# Patient Record
Sex: MALE | Race: WHITE | NOT HISPANIC OR LATINO | URBAN - METROPOLITAN AREA
[De-identification: names, ages, dates, MRNs, and addresses within clinical notes are randomized per-mention and may not be internally consistent; named-entity substitution may affect disease eponyms.]

---

## 2017-06-14 ENCOUNTER — TRANSCRIBE ORDERS (OUTPATIENT)
Dept: LAB | Facility: HOSPITAL | Age: 62
End: 2017-06-14

## 2017-06-14 ENCOUNTER — LAB (OUTPATIENT)
Dept: LAB | Facility: HOSPITAL | Age: 62
End: 2017-06-14

## 2017-06-14 DIAGNOSIS — R53.83 OTHER FATIGUE: ICD-10-CM

## 2017-06-14 DIAGNOSIS — R53.83 OTHER FATIGUE: Primary | ICD-10-CM

## 2017-06-14 PROCEDURE — 36415 COLL VENOUS BLD VENIPUNCTURE: CPT

## 2017-06-14 PROCEDURE — 86481 TB AG RESPONSE T-CELL SUSP: CPT | Performed by: INTERNAL MEDICINE

## 2017-06-19 LAB — REF LAB TEST METHOD: NORMAL

## 2024-05-16 RX ORDER — METHOTREXATE 2.5 MG/1
TABLET ORAL
COMMUNITY
Start: 2024-04-25

## 2024-05-16 RX ORDER — FOLIC ACID 1 MG/1
2000 TABLET ORAL DAILY
Qty: 180 TABLET | Refills: 3 | Status: SHIPPED | OUTPATIENT
Start: 2024-05-16

## 2024-05-16 RX ORDER — FOLIC ACID 1 MG/1
2 TABLET ORAL DAILY
COMMUNITY
Start: 2024-02-06 | End: 2024-05-16 | Stop reason: SDUPTHER

## 2024-05-16 RX ORDER — HYDROXYCHLOROQUINE SULFATE 200 MG/1
1 TABLET, FILM COATED ORAL EVERY 12 HOURS SCHEDULED
COMMUNITY
Start: 2024-02-06

## 2024-06-28 RX ORDER — METHOTREXATE 2.5 MG/1
TABLET ORAL
Qty: 120 TABLET | Refills: 1 | OUTPATIENT
Start: 2024-06-28

## 2024-07-01 RX ORDER — METHOTREXATE 2.5 MG/1
TABLET ORAL
Status: CANCELLED | OUTPATIENT
Start: 2024-07-01

## 2024-07-01 NOTE — TELEPHONE ENCOUNTER
Incoming Refill Request      Medication requested (name and dose): Methotrexate    Pharmacy where request should be sent: car    Additional details provided by patient: N/A    Best call back number: [phone]    Does the patient have less than a 3 day supply:  [] Yes  [x] No    Emili Bull Rep  07/01/24, 09:39 EDT

## 2024-07-02 ENCOUNTER — TELEPHONE (OUTPATIENT)
Age: 69
End: 2024-07-02

## 2024-07-02 RX ORDER — METHOTREXATE 2.5 MG/1
TABLET ORAL
Qty: 120 TABLET | Refills: 0 | Status: SHIPPED | OUTPATIENT
Start: 2024-07-02 | End: 2024-07-05

## 2024-07-05 RX ORDER — METHOTREXATE 2.5 MG/1
TABLET ORAL
Qty: 120 TABLET | Refills: 0 | Status: SHIPPED | OUTPATIENT
Start: 2024-07-05

## 2024-09-25 PROBLEM — M06.9 RA (RHEUMATOID ARTHRITIS): Status: ACTIVE | Noted: 2024-09-25

## 2024-10-10 ENCOUNTER — TELEPHONE (OUTPATIENT)
Age: 69
End: 2024-10-10

## 2024-10-10 ENCOUNTER — OFFICE VISIT (OUTPATIENT)
Age: 69
End: 2024-10-10
Payer: MEDICARE

## 2024-10-10 VITALS
WEIGHT: 212.4 LBS | TEMPERATURE: 98 F | BODY MASS INDEX: 29.73 KG/M2 | DIASTOLIC BLOOD PRESSURE: 86 MMHG | SYSTOLIC BLOOD PRESSURE: 148 MMHG | HEART RATE: 96 BPM | HEIGHT: 71 IN

## 2024-10-10 DIAGNOSIS — M05.79 RHEUMATOID ARTHRITIS INVOLVING MULTIPLE SITES WITH POSITIVE RHEUMATOID FACTOR: Primary | ICD-10-CM

## 2024-10-10 DIAGNOSIS — D84.821 IMMUNOSUPPRESSION DUE TO DRUG THERAPY: ICD-10-CM

## 2024-10-10 DIAGNOSIS — Z79.899 IMMUNOSUPPRESSION DUE TO DRUG THERAPY: ICD-10-CM

## 2024-10-10 DIAGNOSIS — R76.8 ANA POSITIVE: ICD-10-CM

## 2024-10-10 DIAGNOSIS — Z79.899 HIGH RISK MEDICATION USE: ICD-10-CM

## 2024-10-10 DIAGNOSIS — J84.89 NSIP (NONSPECIFIC INTERSTITIAL PNEUMONIA): ICD-10-CM

## 2024-10-10 DIAGNOSIS — I73.00 RAYNAUD'S DISEASE WITHOUT GANGRENE: ICD-10-CM

## 2024-10-10 PROCEDURE — 1160F RVW MEDS BY RX/DR IN RCRD: CPT | Performed by: INTERNAL MEDICINE

## 2024-10-10 PROCEDURE — 99214 OFFICE O/P EST MOD 30 MIN: CPT | Performed by: INTERNAL MEDICINE

## 2024-10-10 PROCEDURE — G2211 COMPLEX E/M VISIT ADD ON: HCPCS | Performed by: INTERNAL MEDICINE

## 2024-10-10 PROCEDURE — 1159F MED LIST DOCD IN RCRD: CPT | Performed by: INTERNAL MEDICINE

## 2024-10-10 RX ORDER — HYDROXYCHLOROQUINE SULFATE 200 MG/1
200 TABLET, FILM COATED ORAL 2 TIMES DAILY
Qty: 180 TABLET | Refills: 1 | Status: SHIPPED | OUTPATIENT
Start: 2024-10-10

## 2024-10-10 RX ORDER — METHOTREXATE 2.5 MG/1
TABLET ORAL
Qty: 120 TABLET | Refills: 0 | Status: SHIPPED | OUTPATIENT
Start: 2024-10-10

## 2024-10-10 RX ORDER — FOLIC ACID 1 MG/1
2000 TABLET ORAL DAILY
Qty: 180 TABLET | Refills: 3 | Status: SHIPPED | OUTPATIENT
Start: 2024-10-10

## 2024-10-10 NOTE — PATIENT INSTRUCTIONS
Methotrexate Tablets    What is this medication?  METHOTREXATE (METH oh TREX ate) treats autoimmune conditions, such as arthritis and psoriasis. It works by decreasing inflammation, which can reduce pain and prevent long-term injury to the joints and skin. It may also be used to treat some types of cancer. It works by slowing down the growth of cancer cells.  This medicine may be used for other purposes; ask your health care provider or pharmacist if you have questions.  COMMON BRAND NAME(S): Rheumatrex, Trexall    What should I tell my care team before I take this medication?  They need to know if you have any of these conditions:  Dehydration  Diabetes  Fluid in the stomach area or lungs  Frequently drink alcohol  Having surgery, including dental surgery  High cholesterol  Immune system problems  Inflammatory bowel disease, such as ulcerative colitis  Kidney disease  Liver disease  Low blood cell levels (white cells, red cells, and platelets)  Lung disease  Recent or ongoing radiation  Recent or upcoming vaccine  Stomach ulcers, other stomach or intestine problems  An unusual or allergic reaction to methotrexate, other medications, foods, dyes, or preservatives  Pregnant or trying to get pregnant  Breastfeeding    How should I use this medication?  Take this medication by mouth with water. Take it as directed on the prescription label. Do not take extra. Keep taking this medication until your care team tells you to stop.  Know why you are taking this medication and how you should take it. To treat conditions such as arthritis and psoriasis, this medication is taken ONCE A WEEK as a single dose or divided into 3 smaller doses taken 12 hours apart (do not take more than 3 doses 12 hours apart each week). This medication is NEVER taken daily to treat conditions other than cancer. Taking this medication more often than directed can cause serious side effects, even death. Talk to your care team about why you are taking  "this medication, how often you will take it, and what your dose is. Ask your care team to put the reason you take this medication on the prescription.  If you take this medication ONCE A WEEK, choose a day of the week before you start. Ask your pharmacist to include the day of the week on the label. Avoid \"Monday\", which could be misread as \"Morning\".  Handling this medication may be harmful. Talk to your care team about how to handle this medication. Special instructions may apply.  Talk to your care team about the use of this medication in children. While it may be prescribed for selected conditions, precautions do apply.  Overdosage: If you think you have taken too much of this medicine contact a poison control center or emergency room at once.  NOTE: This medicine is only for you. Do not share this medicine with others.    What if I miss a dose?  If you miss a dose, talk with your care team. Do not take double or extra doses.    What may interact with this medication?  Do not take this medication with any of the following:  Acitretin  Live virus vaccines  Probenecid  This medication may also interact with the following:  Alcohol  Aspirin and aspirin-like medications  Certain antibiotics, such as penicillin, neomycin, sulfamethoxazole; trimethoprim  Certain medications for stomach problems, such as lansoprazole, omeprazole, pantoprazole  Clozapine  Cyclosporine  Dapsone  Folic acid  Foscarnet  NSAIDs, medications for pain and inflammation, such as ibuprofen or naproxen  Phenytoin  Pyrimethamine  Steroid medications, such as prednisone or cortisone  Tacrolimus  Theophylline  This list may not describe all possible interactions. Give your health care provider a list of all the medicines, herbs, non-prescription drugs, or dietary supplements you use. Also tell them if you smoke, drink alcohol, or use illegal drugs. Some items may interact with your medicine.    What should I watch for while using this " medication?  Visit your care team for regular checks on your progress. It may be some time before you see the benefit from this medication.  You may need blood work done while you are taking this medication.  If your care team has also prescribed folic acid, they may instruct you to skip your folic acid dose on the day you take methotrexate.  This medication can make you more sensitive to the sun. Keep out of the sun. If you cannot avoid being in the sun, wear protective clothing and sunscreen. Do not use sun lamps, tanning beds, or tanning booths.  Check with your care team if you have severe diarrhea, nausea, and vomiting, or if you sweat a lot. The loss of too much body fluid may make it dangerous for you to take this medication.  This medication may increase your risk of getting an infection. Call your care team for advice if you get a fever, chills, sore throat, or other symptoms of a cold or flu. Do not treat yourself. Try to avoid being around people who are sick.  Talk to your care team about your risk of cancer. You may be more at risk for certain types of cancers if you take this medication.  Talk to your care team if you or your partner may be pregnant. Serious birth defects can occur if you take this medication during pregnancy and for 6 months after the last dose. You will need a negative pregnancy test before starting this medication. Contraception is recommended while taking this medication and for 6 months after the last dose. Your care team can help you find the option that works for you.  If your partner can get pregnant, use a condom during sex while taking this medication and for 3 months after the last dose.  Do not breastfeed while taking this medication and for 1 week after the last dose.  This medication may cause infertility. Talk to your care team if you are concerned about your fertility.    What side effects may I notice from receiving this medication?  Side effects that you should report  to your care team as soon as possible:  Allergic reactions--skin rash, itching, hives, swelling of the face, lips, tongue, or throat  Dry cough, shortness of breath or trouble breathing  Infection--fever, chills, cough, sore throat, wounds that don't heal, pain or trouble when passing urine, general feeling of discomfort or being unwell  Kidney injury--decrease in the amount of urine, swelling of the ankles, hands, or feet  Liver injury--right upper belly pain, loss of appetite, nausea, light-colored stool, dark yellow or brown urine, yellowing skin or eyes, unusual weakness or fatigue  Low red blood cell level--unusual weakness or fatigue, dizziness, headache, trouble breathing  Pain, tingling, or numbness in the hands or feet, muscle weakness, change in vision, confusion or trouble speaking, loss of balance or coordination, trouble walking, seizures  Redness, blistering, peeling, or loosening of the skin, including inside the mouth  Stomach bleeding--bloody or black, tar-like stools, vomiting blood or brown material that looks like coffee grounds  Stomach pain that is severe, does not away, or gets worse  Unusual bruising or bleeding  Side effects that usually do not require medical attention (report these to your care team if they continue or are bothersome):  Diarrhea  Dizziness  Hair loss  Nausea  Pain, redness, or swelling with sores inside the mouth or throat  Skin reactions on sun-exposed areas  Vomiting  This list may not describe all possible side effects. Call your doctor for medical advice about side effects. You may report side effects to FDA at 8-797-FDA-4893.    Where should I keep my medication?  Keep out of the reach of children and pets.  Store at room temperature between 20 and 25 degrees C (68 and 77 degrees F). Protect from light. Keep the container tightly closed. Get rid of any unused medication after the expiration date.  To get rid of medications that are no longer needed or have  :  Take the medication to a medication take-back program. Check with your pharmacy or law enforcement to find a location.  If you cannot return the medication, ask your pharmacist or care team how to get rid of this medication safely.  NOTE: This sheet is a summary. It may not cover all possible information. If you have questions about this medicine, talk to your doctor, pharmacist, or health care provider.  ©  [employer]/Gold Standard (2024 00:00:00) Hydroxychloroquine Tablets    What is this medication?  HYDROXYCHLOROQUINE (samy drox ee KLOR oh kwin) treats autoimmune conditions, such as rheumatoid arthritis and lupus. It works by slowing down an overactive immune system. It may also be used to prevent and treat malaria. It works by killing the parasite that causes malaria. It belongs to a group of medications called DMARDs.  This medicine may be used for other purposes; ask your health care provider or pharmacist if you have questions.  COMMON BRAND NAME(S): Plaquenil, Quineprox    What should I tell my care team before I take this medication?  They need to know if you have any of these conditions:  Diabetes  Eye disease, vision problems  Frequently drink alcohol  G6PD deficiency  Heart disease  Irregular heartbeat or rhythm  Kidney disease  Liver disease  Porphyria  Psoriasis  An unusual or allergic reaction to hydroxychloroquine, other medications, foods, dyes, or preservatives  Pregnant or trying to get pregnant  Breastfeeding    How should I use this medication?  Take this medication by mouth with water. Take it as directed on the prescription label. Do not cut, crush, or chew this medication. Swallow the tablets whole. Take it with food. Do not take it more than directed. Take all of this medication unless your care team tells you to stop it early. Keep taking it even if you think you are better.  Take products with antacids in them at a different time of day than this medication. Take this  medication 4 hours before or 4 hours after antacids. Talk to your care team if you have questions.  Talk to your care team about the use of this medication in children. While this medication may be prescribed for selected conditions, precautions do apply.  Overdosage: If you think you have taken too much of this medicine contact a poison control center or emergency room at once.  NOTE: This medicine is only for you. Do not share this medicine with others.    What if I miss a dose?  If you miss a dose, take it as soon as you can. If it is almost time for your next dose, take only that dose. Do not take double or extra doses.    What may interact with this medication?  Do not take this medication with any of the following:  Cisapride  Dronedarone  Pimozide  Thioridazine  This medication may also interact with the following:  Ampicillin  Antacids  Cimetidine  Cyclosporine  Digoxin  Kaolin  Medications for diabetes, such as insulin, glipizide, glyburide  Medications for seizures, such as carbamazepine, phenobarbital, phenytoin  Mefloquine  Methotrexate  Other medications that cause heart rhythm changes  Praziquantel  This list may not describe all possible interactions. Give your health care provider a list of all the medicines, herbs, non-prescription drugs, or dietary supplements you use. Also tell them if you smoke, drink alcohol, or use illegal drugs. Some items may interact with your medicine.    What should I watch for while using this medication?  Visit your care team for regular checks on your progress. Tell your care team if your symptoms do not start to get better or if they get worse.  You may need blood work done while you are taking this medication. If you take other medications that can affect heart rhythm, you may need more testing. Talk to your care team if you have questions.  Your vision may be tested before and during use of this medication. Tell your care team right away if you have any change in  your eyesight.  This medication may cause serious skin reactions. They can happen weeks to months after starting the medication. Contact your care team right away if you notice fevers or flu-like symptoms with a rash. The rash may be red or purple and then turn into blisters or peeling of the skin. Or, you might notice a red rash with swelling of the face, lips or lymph nodes in your neck or under your arms.  If you or your family notice any changes in your behavior, such as new or worsening depression, thoughts of harming yourself, anxiety, or other unusual or disturbing thoughts, or memory loss, call your care team right away.    What side effects may I notice from receiving this medication?  Side effects that you should report to your care team as soon as possible:  Allergic reactions--skin rash, itching, hives, swelling of the face, lips, tongue, or throat  Aplastic anemia--unusual weakness or fatigue, dizziness, headache, trouble breathing, increased bleeding or bruising  Change in vision  Heart rhythm changes--fast or irregular heartbeat, dizziness, feeling faint or lightheaded, chest pain, trouble breathing  Infection--fever, chills, cough, or sore throat  Low blood sugar (hypoglycemia)--tremors or shaking, anxiety, sweating, cold or clammy skin, confusion, dizziness, rapid heartbeat  Muscle injury--unusual weakness or fatigue, muscle pain, dark yellow or brown urine, decrease in amount of urine  Pain, tingling, or numbness in the hands or feet  Rash, fever, and swollen lymph nodes  Redness, blistering, peeling, or loosening of the skin, including inside the mouth  Thoughts of suicide or self-harm, worsening mood, or feelings of depression  Unusual bruising or bleeding  Side effects that usually do not require medical attention (report to your care team if they continue or are bothersome):  Diarrhea  Headache  Nausea  Stomach pain  Vomiting  This list may not describe all possible side effects. Call your  doctor for medical advice about side effects. You may report side effects to FDA at 8-431-QWX-9582.    Where should I keep my medication?  Keep out of the reach of children and pets.  Store at room temperature up to 30 degrees C (86 degrees F). Protect from light. Get rid of any unused medication after the expiration date.  To get rid of medications that are no longer needed or have :  Take the medication to a medication take-back program. Check with your pharmacy or law enforcement to find a location.  If you cannot return the medication, check the label or package insert to see if the medication should be thrown out in the garbage or flushed down the toilet. If you are not sure, ask your care team. If it is safe to put it in the trash, empty the medication out of the container. Mix the medication with cat litter, dirt, coffee grounds, or other unwanted substance. Seal the mixture in a bag or container. Put it in the trash.  NOTE: This sheet is a summary. It may not cover all possible information. If you have questions about this medicine, talk to your doctor, pharmacist, or health care provider.  ©  [employer]/Gold Standard (2023 00:00:00)

## 2024-10-10 NOTE — ASSESSMENT & PLAN NOTE
Methotrexate, Plaquenil    No signs of toxicity.  Medications well tolerated and effective  Risks include but are not limited to severe liver damage that can be fatal, the possible need for liver biopsy, bone marrow suppression that can lead to dangerously low blood counts, GI side effects including mouth sores and diarrhea, fatigue, and rare risk of severe pulmonary complications. There should be no alcohol consumed with MTX. MTX can cause severe fetal abnormalities whether the mother or father is taking the medication and thus must be avoided if pregnancy is a possibility.  All medication is to be taken one day a week only. The need for q 8-12 week labs and the need for folic acid supplementation were discussed.     I discussed the side effects of hydroxychloroquine including but not limited to GI upset, rash, photosensitivity, Hematologic and liver abnormalities and ocular abnormalities and need for frequent eye exam for toxicity monitoring.

## 2024-10-10 NOTE — ASSESSMENT & PLAN NOTE
1:160; +SSA; equiv dsdna    no evidence of systemic lupus/connective tissue disease beyond Raynaud's and inflammatory arthritis.   I suspect this positive SHABBIR is related to rheumatoid

## 2024-10-10 NOTE — ASSESSMENT & PLAN NOTE
History of with history of fibrosis;   prior pulmonary dr olga sidhu ;dx spring 2017   stable on CT scan 2018    ? related to RA.  Clinically inactive  Excellent reports from his pulmonologist and has been released by pulmonary.    No significant pulmonary complaints.  No cough or shortness of breath  Chest x-ray last done 11/8/23 showing bilateral lower lobe opacities favored to represent scar by radiologist  -Follow chest x-ray every year or so

## 2024-10-10 NOTE — PROGRESS NOTES
Office Follow Up      Date: 10/10/2024   Patient Name: Max Gray  MRN: [id number]  YOB: 1955    Referring Physician: Sofi Villanueva APRN     Chief Complaint:   Chief Complaint   Patient presents with    Rheumatoid Arthritis       History of Present Illness: Max Gray is a 69 y.o. male who is here today for follow up on seropositive rheumatoid arthritis.      Continues on methotrexate and Plaquenil for rheumatoid arthritis.    He reports significant improvement in his arthritis since adding Plaquenil to methotrexate in July of 2022.   No swollen or tender joints presently.  Some triggering/locking of the left 2nd finger at times.  Medications well tolerated and effective.  No significant infection.  No side effects medications.  Eye exam stable 4/24    He has history of pulmonary fibrosis on CT scan chest in 2018.  He has been released by Pulmonary Dr. Washington.   No shortness of breath.  No progressive pulmonary symptoms      Subjective       Review of Systems: Review of Systems   Constitutional:  Negative for chills, fatigue, fever and unexpected weight loss.   HENT:  Negative for mouth sores, sinus pressure and sore throat.    Eyes:  Negative for pain and redness.   Respiratory:  Positive for cough. Negative for shortness of breath.    Cardiovascular:  Negative for chest pain.   Gastrointestinal:  Negative for abdominal pain, blood in stool, diarrhea, nausea, vomiting and GERD.   Endocrine: Negative for polydipsia and polyuria.   Genitourinary:  Negative for dysuria, genital sores and hematuria.   Musculoskeletal:  Negative for arthralgias, back pain, joint swelling, myalgias, neck pain and neck stiffness.   Skin:  Negative for rash and bruise.   Allergic/Immunologic: Negative for immunocompromised state.   Neurological:  Negative for seizures, weakness, numbness and memory problem.   Hematological:  Negative for adenopathy. Does not bruise/bleed easily.  "  Psychiatric/Behavioral:  Negative for depressed mood. The patient is not nervous/anxious.         Medications:   Current Outpatient Medications:     folic acid (FOLVITE) 1 MG tablet, Take 2 tablets by mouth Daily., Disp: 180 tablet, Rfl: 3    hydroxychloroquine (PLAQUENIL) 200 MG tablet, Take 1 tablet by mouth 2 (Two) Times a Day., Disp: 180 tablet, Rfl: 1    methotrexate 2.5 MG tablet, TAKE 10 TABLETS BY MOUTH EVERY WEEK, Disp: 120 tablet, Rfl: 0    Allergies:   Allergies   Allergen Reactions    Ondansetron Unknown - Low Severity       I have reviewed and updated the patient's chief complaint, history of present illness, review of systems, past medical history, surgical history, family history, social history, medications and allergy list as appropriate.     Objective        Vital Signs:   Vitals:    10/10/24 0956   BP: 148/86   BP Location: Left arm   Patient Position: Sitting   Cuff Size: Adult   Pulse: 96   Temp: 98 °F (36.7 °C)   Weight: 96.3 kg (212 lb 6.4 oz)   Height: 180.3 cm (71\")   PainSc: 0-No pain     Body mass index is 29.62 kg/m².      Physical Exam:  Physical Exam   MUSCULOSKELETAL:   No peripheral synovitis.  No tender joints    Complete joint exam was performed including the MCPs, PIPs, DIPs of the hands, wrists, elbows, shoulders, hips, knees and ankles.  No soft tissue swelling or tenderness is present except as above.    General: The patient is well-developed and well nourished. Cooperative, alert and oriented. Affect is normal. Hydration appears normal.   HEENT: Normocephalic and atraumatic. No notable alopecia. Lids and conjunctiva are normal. Pupils are equal and sclera are clear. Oropharynx is clear   NECK neck is supple without adenopathy, masses or thyromegaly.   CARDIOVASCULAR: Regular rate and rhythm. No murmurs, rubs or gallops   LUNGS: Effort is normal. Lungs are clear bilateral   ABDOMEN: Not examined  EXTREMITIES: Peripheral pulses are intact. No clubbing.   SKIN: No rashes. No " "subcutaneous nodules. No digital ulcers. No sclerodactyly.   NEUROLOGIC: Gait is normal. Strength testing is normal.  No focal neurologic deficits    Results Review:   Labs:   No results found for: \"GLUCOSE\", \"BUN\", \"CREATININE\", \"EGFRRESULT\", \"EGFR\", \"BCR\", \"K\", \"CO2\", \"CALCIUM\", \"PROTENTOTREF\", \"ALBUMIN\", \"BILITOT\", \"AST\", \"ALT\"  No results found for: \"WBC\", \"HGB\", \"HCT\", \"MCV\", \"PLT\"  No results found for: \"SEDRATE\"  No results found for: \"CRP\"  No results found for: \"QUANTIFERO\", \"QUANTITB1\", \"QUANTITB2\", \"QUANTIFERN\", \"QUANTIFERM\", \"QUANTITBGLDP\"  No results found for: \"RF\"  No results found for: \"HEPBSAG\", \"HEPAIGM\", \"HEPBIGMCORE\", \"HEPCVIRUSABY\"      Procedures    Assessment / Plan        Assessment & Plan  Rheumatoid arthritis involving multiple sites with positive rheumatoid factor  onset dz 2.2017; ++CCP ab; neg rf, elevated esr  NSIP/pulmonary fibrosis on imaging previously followed by Pulmonary Dr. Washington  chest x-ray 11/8/23: Bilateral lower lobe opacities favored to be scar by radiology  **Current:  methotrexate start April 2017, Plaquenil 7/22.    In remission.  Swollen joint count 0. Tender joint count 0.  Good prognosis.  Significant improvement since adding Plaquenil to methotrexate in July of 2022  No side effects to medicine.  Stable yearly eye exam for Plaquenil monitoring.  Records reviewed from Ophthalmology.  He often forgets 2nd Plaquenil pill.  Okay to decrease Plaquenil to 1 tablet daily  Continue labs every 12 weeks for monitoring.    [facility] which have not been sent to us   Labs 4/3/24 reviewed and stable.  New standing lab order provided  Medications refilled.  Chest x-ray yearly for monitoring of history of pulmonary fibrosis(next in spring 2025)  He no longer follows with Pulmonary.    Follow-up in 6 months  High risk medication use  Methotrexate, Plaquenil    No signs of toxicity.  Medications well tolerated and effective  Risks include but " are not limited to severe liver damage that can be fatal, the possible need for liver biopsy, bone marrow suppression that can lead to dangerously low blood counts, GI side effects including mouth sores and diarrhea, fatigue, and rare risk of severe pulmonary complications. There should be no alcohol consumed with MTX. MTX can cause severe fetal abnormalities whether the mother or father is taking the medication and thus must be avoided if pregnancy is a possibility.  All medication is to be taken one day a week only. The need for q 8-12 week labs and the need for folic acid supplementation were discussed.     I discussed the side effects of hydroxychloroquine including but not limited to GI upset, rash, photosensitivity, Hematologic and liver abnormalities and ocular abnormalities and need for frequent eye exam for toxicity monitoring.  Immunosuppression due to drug therapy    SHABBIR positive  1:160; +SSA; equiv dsdna    no evidence of systemic lupus/connective tissue disease beyond Raynaud's and inflammatory arthritis.   I suspect this positive SHABBIR is related to rheumatoid  NSIP (nonspecific interstitial pneumonia)  History of with history of fibrosis;   prior pulmonary dr olga sidhu ;dx spring 2017   stable on CT scan 2018    ? related to RA.  Clinically inactive  Excellent reports from his pulmonologist and has been released by pulmonary.    No significant pulmonary complaints.  No cough or shortness of breath  Chest x-ray last done 11/8/23 showing bilateral lower lobe opacities favored to represent scar by radiologist  -Follow chest x-ray every year or so    Raynaud's disease without gangrene  Not active  Recommend avoidance of cold, smoke, caffeine, stress which can exacerbate Raynaud's    Orders Placed This Encounter   Procedures    Comprehensive Metabolic Panel    CBC Auto Differential    C-reactive Protein    Sedimentation Rate     New Medications Ordered This Visit   Medications    folic acid (FOLVITE) 1  MG tablet     Sig: Take 2 tablets by mouth Daily.     Dispense:  180 tablet     Refill:  3    hydroxychloroquine (PLAQUENIL) 200 MG tablet     Sig: Take 1 tablet by mouth 2 (Two) Times a Day.     Dispense:  180 tablet     Refill:  1    methotrexate 2.5 MG tablet     Sig: TAKE 10 TABLETS BY MOUTH EVERY WEEK     Dispense:  120 tablet     Refill:  0           Follow Up:   Return in about 6 months (around 4/10/2025).        Tommy Howard MD  [facility]

## 2024-10-10 NOTE — ASSESSMENT & PLAN NOTE
onset dz 2.2017; ++CCP ab; neg rf, elevated esr  NSIP/pulmonary fibrosis on imaging previously followed by Pulmonary Dr. Washington  chest x-ray 11/8/23: Bilateral lower lobe opacities favored to be scar by radiology  **Current:  methotrexate start April 2017, Plaquenil 7/22.    In remission.  Swollen joint count 0. Tender joint count 0.  Good prognosis.  Significant improvement since adding Plaquenil to methotrexate in July of 2022  No side effects to medicine.  Stable yearly eye exam for Plaquenil monitoring.  Records reviewed from Ophthalmology.  He often forgets 2nd Plaquenil pill.  Okay to decrease Plaquenil to 1 tablet daily  Continue labs every 12 weeks for monitoring.    [facility] which have not been sent to us   Labs 4/3/24 reviewed and stable.  New standing lab order provided  Medications refilled.  Chest x-ray yearly for monitoring of history of pulmonary fibrosis(next in spring 2025)  He no longer follows with Pulmonary.    Follow-up in 6 months

## 2025-03-03 RX ORDER — METHOTREXATE 2.5 MG/1
TABLET ORAL
Qty: 128 TABLET | OUTPATIENT
Start: 2025-03-03

## 2025-03-03 RX ORDER — METHOTREXATE 2.5 MG/1
TABLET ORAL
Qty: 120 TABLET | Refills: 0 | Status: SHIPPED | OUTPATIENT
Start: 2025-03-03

## 2025-04-14 ENCOUNTER — OFFICE VISIT (OUTPATIENT)
Age: 70
End: 2025-04-14
Payer: MEDICARE

## 2025-04-14 VITALS
BODY MASS INDEX: 29.62 KG/M2 | HEART RATE: 100 BPM | DIASTOLIC BLOOD PRESSURE: 84 MMHG | SYSTOLIC BLOOD PRESSURE: 142 MMHG | HEIGHT: 71 IN | TEMPERATURE: 97.6 F | WEIGHT: 211.6 LBS

## 2025-04-14 DIAGNOSIS — Z79.899 HIGH RISK MEDICATION USE: ICD-10-CM

## 2025-04-14 DIAGNOSIS — D84.821 IMMUNOSUPPRESSION DUE TO DRUG THERAPY: ICD-10-CM

## 2025-04-14 DIAGNOSIS — Z79.899 IMMUNOSUPPRESSION DUE TO DRUG THERAPY: ICD-10-CM

## 2025-04-14 DIAGNOSIS — M05.79 RHEUMATOID ARTHRITIS INVOLVING MULTIPLE SITES WITH POSITIVE RHEUMATOID FACTOR: Primary | ICD-10-CM

## 2025-04-14 PROCEDURE — G2211 COMPLEX E/M VISIT ADD ON: HCPCS | Performed by: INTERNAL MEDICINE

## 2025-04-14 PROCEDURE — 99214 OFFICE O/P EST MOD 30 MIN: CPT | Performed by: INTERNAL MEDICINE

## 2025-04-14 RX ORDER — HYDROXYCHLOROQUINE SULFATE 200 MG/1
200 TABLET, FILM COATED ORAL DAILY
Qty: 90 TABLET | Refills: 1 | Status: SHIPPED | OUTPATIENT
Start: 2025-04-14

## 2025-04-14 RX ORDER — HYDROXYCHLOROQUINE SULFATE 200 MG/1
200 TABLET, FILM COATED ORAL 2 TIMES DAILY
Qty: 180 TABLET | Refills: 1 | Status: SHIPPED | OUTPATIENT
Start: 2025-04-14 | End: 2025-04-14 | Stop reason: SDUPTHER

## 2025-04-14 RX ORDER — METHOTREXATE 2.5 MG/1
TABLET ORAL
Qty: 120 TABLET | Refills: 0 | Status: SHIPPED | OUTPATIENT
Start: 2025-04-14 | End: 2025-04-14

## 2025-04-14 RX ORDER — FOLIC ACID 1 MG/1
2000 TABLET ORAL DAILY
Qty: 180 TABLET | Refills: 3 | Status: SHIPPED | OUTPATIENT
Start: 2025-04-14

## 2025-04-14 RX ORDER — METHOTREXATE 2.5 MG/1
25 TABLET ORAL WEEKLY
Qty: 120 TABLET | Refills: 0 | Status: SHIPPED | OUTPATIENT
Start: 2025-04-14

## 2025-04-14 NOTE — PROGRESS NOTES
Office Follow Up      Date: 04/14/2025   Patient Name: Max Gray  MRN: [id number]  YOB: 1955    Referring Physician: No ref. provider found     Chief Complaint:   Chief Complaint   Patient presents with    Rheumatoid Arthritis       History of Present Illness: Max Gray is a 69 y.o. male who is here today for follow up on seropositive rheumatoid arthritis.       Continues on methotrexate and Plaquenil for rheumatoid arthritis.    He reports significant improvement in his arthritis since adding Plaquenil to methotrexate in July of 2022.   No swollen or tender joints presently.  Some triggering/locking of the left 2nd finger at times.  Medications well tolerated and effective.  No significant infection.  No side effects medications.  Eye exam stable     He has history of pulmonary fibrosis on CT scan chest in 2018.  He has been released by Pulmonary Dr. Washington.   No shortness of breath.  No progressive pulmonary symptoms.  No cough    History of Present Illness        Subjective       Review of Systems: Review of Systems   Constitutional:  Negative for chills, fatigue, fever and unexpected weight loss.   HENT:  Negative for mouth sores, sinus pressure and sore throat.    Eyes:  Negative for pain and redness.   Respiratory:  Positive for cough. Negative for shortness of breath.    Cardiovascular:  Negative for chest pain.   Gastrointestinal:  Positive for diarrhea. Negative for abdominal pain, blood in stool, nausea, vomiting and GERD.   Endocrine: Positive for cold intolerance. Negative for polydipsia and polyuria.   Genitourinary:  Negative for dysuria, genital sores and hematuria.   Musculoskeletal:  Negative for arthralgias, back pain, joint swelling, myalgias, neck pain and neck stiffness.   Skin:  Negative for rash and bruise.   Neurological:  Negative for seizures, weakness, numbness and memory problem.   Hematological:  Negative for adenopathy. Does not bruise/bleed  "easily.   Psychiatric/Behavioral:  Negative for depressed mood. The patient is not nervous/anxious.         Past Medical History:   Past Medical History:   Diagnosis Date    Fatigue     NSIP (nonspecific interstitial pneumonia)     Pulmonary fibrosis     RA (rheumatoid arthritis)        Past Surgical History:   Past Surgical History:   Procedure Laterality Date    CHOLECYSTECTOMY         Family History:   Family History   Problem Relation Age of Onset    Heart attack Father     Cancer Father        Social History:   Social History     Socioeconomic History    Marital status:    Tobacco Use    Smoking status: Former     Types: Cigarettes    Smokeless tobacco: Never   Vaping Use    Vaping status: Never Used   Substance and Sexual Activity    Alcohol use: Not Currently    Drug use: Never    Sexual activity: Defer       Medications:   Current Outpatient Medications:     folic acid (FOLVITE) 1 MG tablet, Take 2 tablets by mouth Daily., Disp: 180 tablet, Rfl: 3    hydroxychloroquine (PLAQUENIL) 200 MG tablet, Take 1 tablet by mouth Daily., Disp: 90 tablet, Rfl: 1    methotrexate 2.5 MG tablet, Take 10 tablets by mouth 1 (One) Time Per Week., Disp: 120 tablet, Rfl: 0    Allergies:   Allergies   Allergen Reactions    Ondansetron Unknown - Low Severity           Objective        Vital Signs:   Vitals:    04/14/25 1429   BP: 142/84   BP Location: Left arm   Patient Position: Sitting   Cuff Size: Adult   Pulse: 100   Temp: 97.6 °F (36.4 °C)   Weight: 96 kg (211 lb 9.6 oz)   Height: 180.3 cm (71\")   PainSc: 0-No pain     Body mass index is 29.51 kg/m².      Physical Exam:  Physical Exam   MUSCULOSKELETAL:   No peripheral synovitis  Scattered OA changes hands    Complete joint exam was performed including the MCPs, PIPs, DIPs of the hands, wrists, elbows, shoulders, hips, knees and ankles.  No soft tissue swelling or tenderness is present except as above.    General: The patient is well-developed and well nourished. " "Cooperative, alert and oriented. Affect is normal. Hydration appears normal.   HEENT: Normocephalic and atraumatic. Lids and conjunctiva are normal. Pupils are equal and sclera are clear. Oropharynx is clear   NECK neck is supple without adenopathy, masses or thyromegaly.   CARDIOVASCULAR: Regular rate and rhythm. No murmurs, rubs or gallops   LUNGS: Effort is normal. Lungs are clear bilateral   ABDOMEN: Not examined  EXTREMITIES: Peripheral pulses are intact. No clubbing.   SKIN: No rashes. No subcutaneous nodules. No digital ulcers. No sclerodactyly.   NEUROLOGIC: Gait is normal. Strength testing is normal.  No focal neurologic deficits    Results Review:   Labs:   No results found for: \"GLUCOSE\", \"BUN\", \"CREATININE\", \"EGFRRESULT\", \"EGFR\", \"BCR\", \"K\", \"CO2\", \"CALCIUM\", \"PROTENTOTREF\", \"ALBUMIN\", \"BILITOT\", \"AST\", \"ALT\"  No results found for: \"WBC\", \"HGB\", \"HCT\", \"MCV\", \"PLT\"  No results found for: \"SEDRATE\"  No results found for: \"CRP\"  No results found for: \"QUANTIFERO\", \"QUANTITB1\", \"QUANTITB2\", \"QUANTIFERN\", \"QUANTIFERM\", \"QUANTITBGLDP\"  No results found for: \"RF\"  No results found for: \"HEPBSAG\", \"HEPAIGM\", \"HEPBIGMCORE\", \"HEPCVIRUSABY\"      Procedures    Assessment / Plan        Rheumatoid arthritis involving multiple sites with positive rheumatoid factor  onset dz 2.2017; ++CCP ab; neg rf, elevated esr  NSIP/pulmonary fibrosis on imaging previously followed by Pulmonary Dr. Washington  chest x-ray 11/8/23: Bilateral lower lobe opacities favored to be scar by radiology  **Current:  methotrexate start 4/2017, Plaquenil 7/22.     RA in remission.  Swollen joint count 0. Tender joint count 0.  Good prognosis.  Significant improvement since adding Plaquenil to methotrexate in July of 2022  No side effects to medicine.  Stable yearly eye exam for Plaquenil monitoring.  Records reviewed from Ophthalmology.  Continue methotrexate once weekly, folic acid daily and Plaquenil 200 mg once daily  Continue labs every 12 " weeks for monitoring CBC CMP sed rate CRP  Outside labs 3/6/2025 reviewed and stable.  New standing lab order provided  Medications refilled.  Chest x-ray yearly for monitoring of history of pulmonary fibrosis(ordered 4/14/25)  He no longer follows with Pulmonary.    Follow-up in 6 months    High risk medication use  Methotrexate, Plaquenil     No signs of toxicity.  Medications well tolerated and effective  Risks include but are not limited to severe liver damage that can be fatal, the possible need for liver biopsy, bone marrow suppression that can lead to dangerously low blood counts, GI side effects including mouth sores and diarrhea, fatigue, and rare risk of severe pulmonary complications. There should be no alcohol consumed with MTX. MTX can cause severe fetal abnormalities whether the mother or father is taking the medication and thus must be avoided if pregnancy is a possibility.  All medication is to be taken one day a week only. The need for q 8-12 week labs and the need for folic acid supplementation were discussed.      I discussed the side effects of hydroxychloroquine including but not limited to GI upset, rash, photosensitivity, Hematologic and liver abnormalities and ocular abnormalities and need for frequent eye exam for toxicity monitoring.    Immunosuppression due to drug therapy     SHABBIR positive  1:160; +SSA; equiv dsdna     no evidence of systemic lupus/connective tissue disease beyond Raynaud's and inflammatory arthritis.   I suspect this positive SHABBIR is related to rheumatoid arthritis    NSIP (nonspecific interstitial pneumonia)  History of with history of fibrosis;   prior pulmonary dr olga sidhu ;dx spring 2017   stable on CT scan 2018     ? related to RA.  Clinically inactive  Excellent reports from his pulmonologist and has been released by pulmonary.    No significant pulmonary complaints.  No cough or shortness of breath  Chest x-ray last done 11/8/23 showing bilateral lower lobe  opacities favored to represent scar by radiologist  -Follow chest x-ray every year ordered 4/14/25     Raynaud's disease without gangrene  Not active  Recommend avoidance of cold, smoke, caffeine, stress which can exacerbate Raynaud's      1. Rheumatoid arthritis involving multiple sites with positive rheumatoid factor    2. High risk medication use    3. Immunosuppression due to drug therapy        Assessment & Plan        Orders Placed This Encounter   Procedures    XR Chest 2 View    Comprehensive Metabolic Panel    CBC Auto Differential    C-reactive Protein    Sedimentation Rate     New Medications Ordered This Visit   Medications    folic acid (FOLVITE) 1 MG tablet     Sig: Take 2 tablets by mouth Daily.     Dispense:  180 tablet     Refill:  3    hydroxychloroquine (PLAQUENIL) 200 MG tablet     Sig: Take 1 tablet by mouth Daily.     Dispense:  90 tablet     Refill:  1    methotrexate 2.5 MG tablet     Sig: Take 10 tablets by mouth 1 (One) Time Per Week.     Dispense:  120 tablet     Refill:  0       Follow Up:   Return in about 6 months (around 10/14/2025).      Discussed plan of care in detail with the patient today.  Patient verbalized understanding and agrees.    I confirm accuracy of unchanged data/findings which have been carried forward from previous visit.  I have updated appropriately those that have changed.        Tmomy Howard MD  [facility] [place] [place]

## 2025-05-01 ENCOUNTER — HOSPITAL ENCOUNTER (OUTPATIENT)
Dept: CT IMAGING | Facility: HOSPITAL | Age: 70
Discharge: HOME | End: 2025-05-01
Admitting: INTERNAL MEDICINE
Payer: MEDICARE

## 2025-05-01 DIAGNOSIS — R93.89 ABNORMAL CHEST X-RAY: ICD-10-CM

## 2025-05-01 DIAGNOSIS — M05.79 RHEUMATOID ARTHRITIS INVOLVING MULTIPLE SITES WITH POSITIVE RHEUMATOID FACTOR: ICD-10-CM

## 2025-05-01 PROCEDURE — 71250 CT THORAX DX C-: CPT

## 2025-07-16 ENCOUNTER — OFFICE VISIT (OUTPATIENT)
Dept: PULMONOLOGY | Facility: CLINIC | Age: 70
End: 2025-07-16
Payer: MEDICARE

## 2025-07-16 VITALS
SYSTOLIC BLOOD PRESSURE: 138 MMHG | DIASTOLIC BLOOD PRESSURE: 88 MMHG | BODY MASS INDEX: 29.26 KG/M2 | HEIGHT: 71 IN | WEIGHT: 209 LBS | OXYGEN SATURATION: 99 % | HEART RATE: 97 BPM

## 2025-07-16 DIAGNOSIS — J84.89 NSIP (NONSPECIFIC INTERSTITIAL PNEUMONIA): ICD-10-CM

## 2025-07-16 DIAGNOSIS — M05.79 RHEUMATOID ARTHRITIS INVOLVING MULTIPLE SITES WITH POSITIVE RHEUMATOID FACTOR: Primary | ICD-10-CM

## 2025-07-16 NOTE — PROGRESS NOTES
New Patient Pulmonary Office Visit      Patient Name: Max Gray    Referring Physician: Tommy Howard MD    Chief Complaint:    Chief Complaint   Patient presents with    ILD       History of Present Illness: Max Gray is a 70 y.o. male who is here today to establish care with Pulmonary.  Patient has a past medical history significant for Raynaud's, rheumatoid arthritis, and NSIP.  Patient was referred to pulmonary for evaluation of ILD.  Patient states that he originally was diagnosed in 2017 with rheumatoid arthritis.  Had interstitial lung disease.  They followed him up for couple of years but PFTs were normal so they stopped following them up.  He does note that he has gotten gradually more short of breath over time.  Denies any nausea, vomit, fever, or chills.  Currently on Plaquenil and methotrexate.  His rheumatoid is currently under good control.  Used to be a very avid golfer but has not golfed in many years now.  No other acute complaints.    Review of Systems:   Review of Systems   Constitutional:  Negative for chills, fatigue and fever.   HENT:  Negative for congestion and voice change.    Eyes:  Negative for blurred vision.   Respiratory:  Positive for shortness of breath. Negative for cough and wheezing.    Cardiovascular:  Negative for chest pain.   Skin:  Negative for dry skin.   Hematological:  Negative for adenopathy.   Psychiatric/Behavioral:  Negative for agitation and depressed mood.        Past Medical History:   Past Medical History:   Diagnosis Date    Fatigue     NSIP (nonspecific interstitial pneumonia)     Pulmonary fibrosis     RA (rheumatoid arthritis)        Past Surgical History:   Past Surgical History:   Procedure Laterality Date    CHOLECYSTECTOMY         Family History:   Family History   Problem Relation Age of Onset    Heart attack Father     Cancer Father        Social History:   Social History     Socioeconomic History    Marital status:    Tobacco Use  "   Smoking status: Former     Current packs/day: 0.00     Average packs/day: 1 pack/day for 15.0 years (15.0 ttl pk-yrs)     Types: Cigarettes     Start date:      Quit date:      Years since quittin.5    Smokeless tobacco: Never   Vaping Use    Vaping status: Never Used   Substance and Sexual Activity    Alcohol use: Not Currently    Drug use: Never    Sexual activity: Defer       Medications:     Current Outpatient Medications:     folic acid (FOLVITE) 1 MG tablet, Take 2 tablets by mouth Daily., Disp: 180 tablet, Rfl: 3    hydroxychloroquine (PLAQUENIL) 200 MG tablet, Take 1 tablet by mouth Daily., Disp: 90 tablet, Rfl: 1    methotrexate 2.5 MG tablet, Take 10 tablets by mouth 1 (One) Time Per Week., Disp: 120 tablet, Rfl: 0    Allergies:   Allergies   Allergen Reactions    Ondansetron Unknown - Low Severity       Physical Exam:  Vital Signs:   Vitals:    25 0820   BP: 138/88   BP Location: Right arm   Patient Position: Sitting   Cuff Size: Adult   Pulse: 97   SpO2: 99%   Weight: 94.8 kg (209 lb)   Height: 180.3 cm (70.98\")       Physical Exam  Vitals and nursing note reviewed.   Constitutional:       General: He is not in acute distress.     Appearance: He is well-developed and normal weight. He is not ill-appearing or toxic-appearing.   Cardiovascular:      Rate and Rhythm: Normal rate and regular rhythm.      Pulses: Normal pulses.      Heart sounds: Normal heart sounds. No murmur heard.     No gallop.   Pulmonary:      Effort: Pulmonary effort is normal.      Breath sounds: Normal breath sounds. No wheezing, rhonchi or rales.   Musculoskeletal:      Right lower leg: No edema.      Left lower leg: No edema.   Neurological:      Mental Status: He is alert.         Immunization History   Administered Date(s) Administered    COVID-19 (UNSPECIFIED) 2021       Results Review:   - I personally reviewed the pts imaging from CT scan of the chest from 2025 showed coarse interstitial " markings in the lower lobes bilaterally, most concerning for UIP in the setting of rheumatoid arthritis.  - I personally reviewed the pts PFT from 7/16/2025 showed moderate restriction without obstruction and a moderately reduced DLCO, this is significantly reduced from 2018 when he had a normal PFT.  - I personally reviewed the pts chart with regards to evaluation by rheumatology, and previous pulmonary records from [facility].    Assessment / Plan:   Diagnoses and all orders for this visit:    1. Rheumatoid arthritis involving multiple sites with positive rheumatoid factor (Primary)  2. NSIP (nonspecific interstitial pneumonia)  - I am concerned about the significant difference in his PFTs between 2018 and 2025.  Obviously rheumatoid can progress very slowly, but if that is the case while on therapy then I would recommend using antifibrotic agent such as Ofev.  We want make the decision off of the first PFT, I want to see him back in 3 months with a repeat set of PFTs and a 6-minute walk test.  And then in addition to that I am going to get his previous imaging from [facility] sent to the office so I can review to see if there has been truly a progression.  Obviously he is also on methotrexate which is a risk factor for ILD, could be very difficult to be able to tell which one is causing the progression.  I do think it is worth it to keep a very close eye on it we may have to consider changing the medication in the future.  I want him getting 30 minutes of cardiovascular exercise in per day we discussed this in great detail on today's visit.    Follow Up:   Return in about 3 months (around 10/16/2025) for PFTs, 6-minute walk test.     MNAUEL Perez, DO  Pulmonary and Critical Care Medicine  Note Electronically Signed    Part of this note may be an electronic transcription/translation of spoken language to printed text using the Dragon Dictation System.

## 2025-08-20 RX ORDER — METHOTREXATE 2.5 MG/1
25 TABLET ORAL WEEKLY
Qty: 40 TABLET | Refills: 0 | Status: SHIPPED | OUTPATIENT
Start: 2025-08-20